# Patient Record
Sex: FEMALE | Race: WHITE | NOT HISPANIC OR LATINO | Employment: FULL TIME | URBAN - NONMETROPOLITAN AREA
[De-identification: names, ages, dates, MRNs, and addresses within clinical notes are randomized per-mention and may not be internally consistent; named-entity substitution may affect disease eponyms.]

---

## 2024-01-29 ENCOUNTER — OFFICE VISIT (OUTPATIENT)
Dept: OBSTETRICS AND GYNECOLOGY | Age: 24
End: 2024-01-29
Payer: COMMERCIAL

## 2024-01-29 VITALS
DIASTOLIC BLOOD PRESSURE: 70 MMHG | WEIGHT: 204 LBS | BODY MASS INDEX: 37.54 KG/M2 | HEIGHT: 62 IN | SYSTOLIC BLOOD PRESSURE: 128 MMHG

## 2024-01-29 DIAGNOSIS — Z30.011 ENCOUNTER FOR PRESCRIPTION OF ORAL CONTRACEPTIVES: ICD-10-CM

## 2024-01-29 DIAGNOSIS — N92.0 MENORRHAGIA WITH REGULAR CYCLE: Primary | ICD-10-CM

## 2024-01-29 LAB
B-HCG UR QL: NEGATIVE
EXPIRATION DATE: NORMAL
INTERNAL NEGATIVE CONTROL: NEGATIVE
INTERNAL POSITIVE CONTROL: POSITIVE
Lab: NORMAL

## 2024-01-29 PROCEDURE — 81025 URINE PREGNANCY TEST: CPT

## 2024-01-29 PROCEDURE — 99213 OFFICE O/P EST LOW 20 MIN: CPT

## 2024-01-29 RX ORDER — DROSPIRENONE AND ETHINYL ESTRADIOL 0.02-3(28)
1 KIT ORAL DAILY
Qty: 84 TABLET | Refills: 0 | Status: SHIPPED | OUTPATIENT
Start: 2024-01-29 | End: 2024-04-28

## 2024-01-29 NOTE — PROGRESS NOTES
Subjective   Ana Maria RODRIGUEZ is a 23 y.o. female.     History of Present Illness  The patient is new to [facility] and here today to establish care. She reports her last pap smear was performed last year with her PCP and was normal. She would like to establish care here and discuss contraceptive options, specifically starting an oral form. She reports her cycles are regular, lasting 5-6 days, with heavy flow for the first two days. She does pass clots with her cycles.   Contraception  This is a new problem. The current episode started more than 1 year ago. The problem occurs every several days. The problem has been waxing and waning. Associated symptoms include abdominal pain and fatigue. Pertinent negatives include no anorexia, arthralgias, change in bowel habit, chest pain, chills, congestion, coughing, diaphoresis, fever, headaches, joint swelling, myalgias, nausea, neck pain, numbness, rash, sore throat, swollen glands, urinary symptoms, vertigo, visual change, vomiting or weakness. Nothing aggravates the symptoms. She has tried nothing for the symptoms. The treatment provided no relief.        Review of Systems   Constitutional:  Positive for fatigue. Negative for chills, diaphoresis and fever.   HENT: Negative.  Negative for congestion, sore throat and swollen glands.    Eyes: Negative.    Respiratory: Negative.  Negative for cough.    Cardiovascular: Negative.  Negative for chest pain.   Gastrointestinal:  Positive for abdominal pain. Negative for anorexia, change in bowel habit, nausea and vomiting.   Endocrine: Negative.    Genitourinary:  Positive for menstrual problem.   Musculoskeletal: Negative.  Negative for arthralgias, joint swelling, myalgias and neck pain.   Skin: Negative.  Negative for rash.   Allergic/Immunologic: Negative.    Neurological: Negative.  Negative for vertigo, weakness and numbness.   Hematological: Negative.    Psychiatric/Behavioral: Negative.         Objective   Physical  Exam  Vitals and nursing note reviewed. Exam conducted with a chaperone present.   Constitutional:       General: She is not in acute distress.     Appearance: She is well-developed. She is obese. She is not diaphoretic.   HENT:      Head: Normocephalic.      Right Ear: External ear normal.      Left Ear: External ear normal.      Nose: Nose normal.   Eyes:      General: No scleral icterus.        Right eye: No discharge.         Left eye: No discharge.      Conjunctiva/sclera: Conjunctivae normal.      Pupils: Pupils are equal, round, and reactive to light.   Neck:      Thyroid: No thyromegaly.      Vascular: No carotid bruit.      Trachea: No tracheal deviation.   Cardiovascular:      Rate and Rhythm: Normal rate and regular rhythm.      Pulses: Normal pulses.      Heart sounds: Normal heart sounds. No murmur heard.  Pulmonary:      Effort: Pulmonary effort is normal. No respiratory distress.      Breath sounds: Normal breath sounds. No wheezing.   Chest:   Breasts:     Breasts are symmetrical.      Right: Normal. No swelling, bleeding, inverted nipple, mass, nipple discharge, skin change or tenderness.      Left: Normal. No swelling, bleeding, inverted nipple, mass, nipple discharge, skin change or tenderness.   Abdominal:      General: Abdomen is flat. Bowel sounds are normal. There is no distension.      Palpations: Abdomen is soft. There is no mass.      Tenderness: There is no abdominal tenderness. There is no right CVA tenderness, left CVA tenderness or guarding.      Hernia: No hernia is present.   Genitourinary:     Comments: BSU normal  Urethral meatus  Normal  Perineum  Normal  Musculoskeletal:         General: No tenderness. Normal range of motion.      Cervical back: Normal range of motion and neck supple.   Lymphadenopathy:      Head:      Right side of head: No submental, submandibular, tonsillar, preauricular, posterior auricular or occipital adenopathy.      Left side of head: No submental,  submandibular, tonsillar, preauricular, posterior auricular or occipital adenopathy.      Cervical: No cervical adenopathy.      Right cervical: No superficial, deep or posterior cervical adenopathy.     Left cervical: No superficial, deep or posterior cervical adenopathy.      Upper Body:      Right upper body: No supraclavicular, axillary or pectoral adenopathy.      Left upper body: No supraclavicular, axillary or pectoral adenopathy.   Skin:     General: Skin is warm and dry.      Findings: No bruising, erythema or rash.   Neurological:      Mental Status: She is alert and oriented to person, place, and time.      Coordination: Coordination normal.   Psychiatric:         Mood and Affect: Mood normal.         Behavior: Behavior normal.         Thought Content: Thought content normal.         Judgment: Judgment normal.       Assessment & Plan   Diagnoses and all orders for this visit:    1. Menorrhagia with regular cycle (Primary)  Comments:  The patient reports cycles are normal now lasting 5-6 days, with heavy bleeding, and passing of large clots. She has to wears double protection (pad and tampon) and changes this every 2 hours. She admits to abdominal cramping and fatigue during her cycle. She has previously tried the Mirena IUD for this and reports her bleeding and cramping were worse with the device. She reports a history of hemorrhagic ovarian cysts but denies pelvic pain today.     2. Encounter for prescription of oral contraceptives  Comments:  The patient wishes to discuss ocp options. Verbal and visual education were given regarding options for contraception, including injectable contraception, IUD placement, oral contraceptives, Nexplanon, patch, NuvaRing, and barrier methods. At this time, she wishes to start oral contraception. She was advised that oral contraception does not prevent STDs and she would need to use a backup form of contraception such as a barrier or condom for pregnancy prevention.  She v/u of this information. She will return to the office in 3 months to gauge how this ocp is working for her. She acknowledges that adjustments to oral medications may be needed to achieve her desired effects of period control.   Orders:  -     drospirenone-ethinyl estradiol (FELTON) 3-0.02 MG per tablet; Take 1 tablet by mouth Daily  Dispense: 84 tablet; Refill: 0        LEANNE Baker

## 2024-06-11 DIAGNOSIS — F41.1 GAD (GENERALIZED ANXIETY DISORDER): ICD-10-CM

## 2024-06-11 RX ORDER — FLUOXETINE HYDROCHLORIDE 40 MG/1
40 CAPSULE ORAL DAILY
Qty: 90 CAPSULE | Refills: 1 | Status: SHIPPED | OUTPATIENT
Start: 2024-06-11

## 2024-07-24 ENCOUNTER — LAB (OUTPATIENT)
Dept: LAB | Facility: HOSPITAL | Age: 24
End: 2024-07-24
Payer: COMMERCIAL

## 2024-07-24 ENCOUNTER — OFFICE VISIT (OUTPATIENT)
Dept: FAMILY MEDICINE CLINIC | Facility: CLINIC | Age: 24
End: 2024-07-24
Payer: COMMERCIAL

## 2024-07-24 VITALS
SYSTOLIC BLOOD PRESSURE: 114 MMHG | HEART RATE: 67 BPM | HEIGHT: 62 IN | BODY MASS INDEX: 38.05 KG/M2 | RESPIRATION RATE: 18 BRPM | DIASTOLIC BLOOD PRESSURE: 78 MMHG | TEMPERATURE: 97.4 F | OXYGEN SATURATION: 97 % | WEIGHT: 206.8 LBS

## 2024-07-24 DIAGNOSIS — F41.1 GAD (GENERALIZED ANXIETY DISORDER): ICD-10-CM

## 2024-07-24 DIAGNOSIS — E66.9 OBESITY (BMI 30-39.9): ICD-10-CM

## 2024-07-24 DIAGNOSIS — Z00.00 ANNUAL PHYSICAL EXAM: Primary | ICD-10-CM

## 2024-07-24 LAB
ALBUMIN SERPL-MCNC: 4.1 G/DL (ref 3.5–5.2)
ALBUMIN/GLOB SERPL: 1.2 G/DL
ALP SERPL-CCNC: 67 U/L (ref 39–117)
ALT SERPL W P-5'-P-CCNC: 24 U/L (ref 1–33)
ANION GAP SERPL CALCULATED.3IONS-SCNC: 9 MMOL/L (ref 5–15)
AST SERPL-CCNC: 22 U/L (ref 1–32)
BILIRUB SERPL-MCNC: 0.2 MG/DL (ref 0–1.2)
BUN SERPL-MCNC: 11 MG/DL (ref 6–20)
BUN/CREAT SERPL: 17.5 (ref 7–25)
CALCIUM SPEC-SCNC: 8.8 MG/DL (ref 8.6–10.5)
CHLORIDE SERPL-SCNC: 104 MMOL/L (ref 98–107)
CHOLEST SERPL-MCNC: 171 MG/DL (ref 0–200)
CO2 SERPL-SCNC: 26 MMOL/L (ref 22–29)
CREAT SERPL-MCNC: 0.63 MG/DL (ref 0.57–1)
EGFRCR SERPLBLD CKD-EPI 2021: 127.2 ML/MIN/1.73
GLOBULIN UR ELPH-MCNC: 3.3 GM/DL
GLUCOSE SERPL-MCNC: 93 MG/DL (ref 65–99)
HBA1C MFR BLD: 5.1 % (ref 4.8–5.6)
HDLC SERPL-MCNC: 42 MG/DL (ref 40–60)
LDLC SERPL CALC-MCNC: 114 MG/DL (ref 0–100)
LDLC/HDLC SERPL: 2.68 {RATIO}
POTASSIUM SERPL-SCNC: 4.2 MMOL/L (ref 3.5–5.2)
PROT SERPL-MCNC: 7.4 G/DL (ref 6–8.5)
SODIUM SERPL-SCNC: 139 MMOL/L (ref 136–145)
TRIGL SERPL-MCNC: 82 MG/DL (ref 0–150)
TSH SERPL DL<=0.05 MIU/L-ACNC: 0.98 UIU/ML (ref 0.27–4.2)
VLDLC SERPL-MCNC: 15 MG/DL (ref 5–40)

## 2024-07-24 PROCEDURE — 80061 LIPID PANEL: CPT

## 2024-07-24 PROCEDURE — 99395 PREV VISIT EST AGE 18-39: CPT | Performed by: FAMILY MEDICINE

## 2024-07-24 PROCEDURE — 83036 HEMOGLOBIN GLYCOSYLATED A1C: CPT

## 2024-07-24 PROCEDURE — 84443 ASSAY THYROID STIM HORMONE: CPT

## 2024-07-24 PROCEDURE — 80053 COMPREHEN METABOLIC PANEL: CPT

## 2024-07-24 PROCEDURE — 36415 COLL VENOUS BLD VENIPUNCTURE: CPT

## 2024-07-24 NOTE — PROGRESS NOTES
"Chief Complaint  Annual Exam    Subjective        Ana Maria RODRIGUEZ presents to [facility]  History of Present Illness  Here for annual exam  Anxiety stable on prozac  ROS neg    Past Medical History:   Diagnosis Date    Scoliosis      Past Surgical History:   Procedure Laterality Date    COLONOSCOPY      WISDOM TOOTH EXTRACTION       Current Outpatient Medications   Medication Instructions    drospirenone-ethinyl estradiol (FELTON) 3-0.02 MG per tablet Take 1 tablet by mouth Daily    FLUoxetine (PROZAC) 40 mg, Oral, Daily    ondansetron ODT (ZOFRAN-ODT) 8 mg, Translingual, Every 8 Hours PRN     Allergies   Allergen Reactions    Wasp Venom Protein Swelling     Social History     Tobacco Use    Smoking status: Never     Passive exposure: Never    Smokeless tobacco: Never   Vaping Use    Vaping status: Former    Quit date: 1/1/2021    Substances: Nicotine    Devices: Disposable   Substance Use Topics    Alcohol use: Yes     Alcohol/week: 1.0 standard drink of alcohol     Types: 1 Drinks containing 0.5 oz of alcohol per week     Comment: 1 drink per week    Drug use: No     Family History   Problem Relation Age of Onset    No Known Problems Father     No Known Problems Mother     No Known Problems Brother     No Known Problems Sister     Breast cancer Neg Hx     Ovarian cancer Neg Hx     Uterine cancer Neg Hx     Colon cancer Neg Hx     Melanoma Neg Hx        Objective   Vital Signs:  /78   Pulse 67   Temp 97.4 °F (36.3 °C)   Resp 18   Ht 157.5 cm (62\")   Wt 93.8 kg (206 lb 12.8 oz)   SpO2 97%   BMI 37.82 kg/m²   Estimated body mass index is 37.82 kg/m² as calculated from the following:    Height as of this encounter: 157.5 cm (62\").    Weight as of this encounter: 93.8 kg (206 lb 12.8 oz).               Physical Exam  Constitutional:       General: She is not in acute distress.     Appearance: Normal appearance. She is obese. She is not ill-appearing or diaphoretic. "   HENT:      Head: Normocephalic and atraumatic.      Right Ear: Tympanic membrane and external ear normal.      Left Ear: Tympanic membrane and external ear normal.      Nose: Nose normal. No congestion or rhinorrhea.      Mouth/Throat:      Mouth: Mucous membranes are moist.      Pharynx: Oropharynx is clear. No oropharyngeal exudate or posterior oropharyngeal erythema.   Eyes:      General: No scleral icterus.        Right eye: No discharge.         Left eye: No discharge.      Extraocular Movements: Extraocular movements intact.      Conjunctiva/sclera: Conjunctivae normal.      Pupils: Pupils are equal, round, and reactive to light.   Neck:      Thyroid: No thyromegaly.      Vascular: No JVD.   Cardiovascular:      Rate and Rhythm: Normal rate and regular rhythm.      Pulses: Normal pulses.      Heart sounds: Normal heart sounds. No murmur heard.     No friction rub. No gallop.   Pulmonary:      Effort: Pulmonary effort is normal.      Breath sounds: Normal breath sounds.   Abdominal:      General: Bowel sounds are normal. There is no distension.      Palpations: Abdomen is soft. There is no mass.      Tenderness: There is no abdominal tenderness. There is no guarding or rebound.   Musculoskeletal:         General: No deformity or signs of injury.      Cervical back: Neck supple.      Right lower leg: No edema.      Left lower leg: No edema.   Lymphadenopathy:      Cervical: No cervical adenopathy.   Skin:     General: Skin is warm and dry.      Capillary Refill: Capillary refill takes less than 2 seconds.      Coloration: Skin is not jaundiced or pale.   Neurological:      Mental Status: She is alert and oriented to person, place, and time. Mental status is at baseline.   Psychiatric:         Mood and Affect: Mood normal.         Behavior: Behavior normal.         Thought Content: Thought content normal.         Judgment: Judgment normal.        Result Review :                     Assessment and Plan      Diagnoses and all orders for this visit:    1. Annual physical exam (Primary)    2. CELESTINO (generalized anxiety disorder)    3. Obesity (BMI 30-39.9)  -     Lipid panel; Future  -     TSH; Future  -     Hemoglobin A1c; Future  -     Comprehensive Metabolic Panel; Future    Preventative: encourage diet, exercise for weight loss  Continue prozac  F/u yearly

## 2024-09-18 ENCOUNTER — TELEMEDICINE (OUTPATIENT)
Dept: FAMILY MEDICINE CLINIC | Facility: TELEHEALTH | Age: 24
End: 2024-09-18
Payer: COMMERCIAL

## 2024-09-18 DIAGNOSIS — J06.9 UPPER RESPIRATORY TRACT INFECTION, UNSPECIFIED TYPE: Primary | ICD-10-CM

## 2024-09-18 RX ORDER — BROMPHENIRAMINE MALEATE, PSEUDOEPHEDRINE HYDROCHLORIDE, AND DEXTROMETHORPHAN HYDROBROMIDE 2; 30; 10 MG/5ML; MG/5ML; MG/5ML
5 SYRUP ORAL 3 TIMES DAILY PRN
Qty: 120 ML | Refills: 0 | Status: SHIPPED | OUTPATIENT
Start: 2024-09-18

## 2024-11-26 ENCOUNTER — TELEPHONE (OUTPATIENT)
Dept: FAMILY MEDICINE CLINIC | Facility: CLINIC | Age: 24
End: 2024-11-26
Payer: COMMERCIAL

## 2024-11-26 DIAGNOSIS — F41.1 GAD (GENERALIZED ANXIETY DISORDER): ICD-10-CM

## 2024-11-26 RX ORDER — FLUOXETINE 40 MG/1
40 CAPSULE ORAL DAILY
Qty: 90 CAPSULE | Refills: 1 | Status: SHIPPED | OUTPATIENT
Start: 2024-11-26

## 2024-11-26 NOTE — TELEPHONE ENCOUNTER
Caller: Ana Maria RODRIGUEZ    Relationship: Self    Best call back number: [phone]     What is the best time to reach you: ANYTIME    Who are you requesting to speak with (clinical staff, provider,  specific staff member): CLINICAL    What was the call regarding: FLUoxetine (PROzac) 40 MG capsule PLEASE SWITCH OVER TO     [facility] #[id number] - [facility] - [address] AT [facility] [address] - [phone]  - [phone] FX     Is it okay if the provider responds through MyChart: NO

## 2025-02-14 ENCOUNTER — OFFICE VISIT (OUTPATIENT)
Dept: INTERNAL MEDICINE | Facility: CLINIC | Age: 25
End: 2025-02-14

## 2025-02-14 VITALS
BODY MASS INDEX: 39.75 KG/M2 | SYSTOLIC BLOOD PRESSURE: 110 MMHG | HEART RATE: 80 BPM | TEMPERATURE: 98.6 F | OXYGEN SATURATION: 99 % | RESPIRATION RATE: 16 BRPM | HEIGHT: 62 IN | DIASTOLIC BLOOD PRESSURE: 82 MMHG | WEIGHT: 216 LBS

## 2025-02-14 DIAGNOSIS — E78.00 ELEVATED LDL CHOLESTEROL LEVEL: ICD-10-CM

## 2025-02-14 DIAGNOSIS — F41.1 GAD (GENERALIZED ANXIETY DISORDER): ICD-10-CM

## 2025-02-14 DIAGNOSIS — Z76.89 ENCOUNTER TO ESTABLISH CARE: Primary | ICD-10-CM

## 2025-02-14 DIAGNOSIS — E66.812 CLASS 2 SEVERE OBESITY DUE TO EXCESS CALORIES WITH SERIOUS COMORBIDITY AND BODY MASS INDEX (BMI) OF 39.0 TO 39.9 IN ADULT: ICD-10-CM

## 2025-02-14 DIAGNOSIS — E66.01 CLASS 2 SEVERE OBESITY DUE TO EXCESS CALORIES WITH SERIOUS COMORBIDITY AND BODY MASS INDEX (BMI) OF 39.0 TO 39.9 IN ADULT: ICD-10-CM

## 2025-02-14 RX ORDER — FLUOXETINE 40 MG/1
40 CAPSULE ORAL DAILY
Qty: 90 CAPSULE | Refills: 1 | Status: SHIPPED | OUTPATIENT
Start: 2025-02-14

## 2025-02-14 NOTE — PROGRESS NOTES
Subjective   Ana Maria RODRIGUEZ is a 24 y.o. female.   Chief Complaint   Patient presents with    Establish Care     Patient states she has a spot on her right upper back.       History of Present Illness   History of Present Illness  The patient is a 24-year-old female who presents to the office today to establish care.    She reports a scar on her back, initially identified as a freckle, which has been progressively enlarging over the past year. The change in size was first noticed by her sister. She has no personal history of skin cancer but acknowledges a family predisposition to various cancers. She also reports the presence of other spots that have increased in size and elevation, which she attributes to her previous tanning habits. However, she has not observed any changes in these spots over the past 3 months. She has a history of sensitive skin, prone to severe sunburns, including an incident resulting in a second-degree burn. Despite a past history of tanning bed use, she has abstained from this practice since the age of 19 due to the enlargement of freckles on her arms.    Her last physical examination was conducted in 07/2024, during which no laboratory tests were performed. She has a history of colonoscopy due to digestive issues and hemorrhoids, which were associated with significant bleeding. She also reports a history of constipation, characterized by slow, hard bowel movements, which has since resolved following dietary modifications. She now maintains regular bowel movements. She has been adhering to a high-fiber diet for several years and has recently initiated a calorie-deficit diet to facilitate weight loss. She initially reduced her caloric intake to 1600 calories, but due to lack of efficacy, she further reduced it to 1300 calories. She reports one episode of hypoglycemia during work, necessitating immediate food intake. She has since adjusted her meal schedule to include a snack between  breakfast and lunch, which appears to be effective. She is currently not using insurance due to some issues and is relying on her health savings account.    She is currently on fluoxetine 40 mg daily for anxiety management, which she reports as being highly effective. She is seeking a refill of this medication.    She has a known allergy to WASP venom, with a previous sting resulting in significant swelling. She does not currently possess an EpiPen due to expiration and lack of insurance coverage.    She reports no chest pain or shortness of breath. She has a history of asthma in childhood, but has not experienced any symptoms since the age of 6. She does not smoke or vape, although she admits to brief experimentation at the age of 17, which she found unpleasant.    Supplemental Information  She had her wisdom teeth removed.    SOCIAL HISTORY  She does not smoke or vape. She admits to using a tanning bed in the past but stopped at age 19.    FAMILY HISTORY  She reports a strong family history of cancer, including her maternal grandmother, paternal grandfather, maternal grandfather, and paternal grandmother. Her father has heart problems, but she is unsure of the specifics.    ALLERGIES  She is allergic to WASP STINGS.    MEDICATIONS  fluoxetine       The following portions of the patient's history were reviewed and updated as appropriate: allergies, current medications, past family history, past medical history, past social history, past surgical history and problem list.    Review of Systems    Objective   Past Medical History:   Diagnosis Date    Scoliosis       Past Surgical History:   Procedure Laterality Date    COLONOSCOPY      WISDOM TOOTH EXTRACTION          Current Outpatient Medications:     FLUoxetine (PROzac) 40 MG capsule, Take 1 capsule by mouth Daily., Disp: 90 capsule, Rfl: 1      /82 (BP Location: Left arm, Patient Position: Sitting, Cuff Size: Adult)   Pulse 80   Temp 98.6 °F (37 °C)  "(Infrared)   Resp 16   Ht 157.5 cm (62\")   Wt 98 kg (216 lb)   SpO2 99%   BMI 39.51 kg/m²      Body mass index is 39.51 kg/m².          Physical Exam  Vitals and nursing note reviewed.   Constitutional:       General: She is not in acute distress.     Appearance: Normal appearance. She is obese. She is not ill-appearing, toxic-appearing or diaphoretic.   HENT:      Head: Normocephalic and atraumatic.   Eyes:      Extraocular Movements: Extraocular movements intact.      Conjunctiva/sclera: Conjunctivae normal.      Pupils: Pupils are equal, round, and reactive to light.   Cardiovascular:      Rate and Rhythm: Normal rate and regular rhythm.      Pulses: Normal pulses.      Heart sounds: Normal heart sounds.   Pulmonary:      Effort: Pulmonary effort is normal.      Breath sounds: Normal breath sounds.   Abdominal:      General: Bowel sounds are normal.      Palpations: Abdomen is soft.   Musculoskeletal:         General: Normal range of motion.      Cervical back: Normal range of motion and neck supple.   Skin:     General: Skin is warm.      Findings: Lesion present.             Comments: Roughly 10 mm sized raised lesion with epidermal and subdermal presents based on palpation, slight discoloration in central region, more tanned versus the rest is more natural skin tone.  Nonpainful, no surrounding erythema, no evidence of drainage.   Neurological:      General: No focal deficit present.      Mental Status: She is alert and oriented to person, place, and time. Mental status is at baseline.   Psychiatric:         Mood and Affect: Mood normal.         Behavior: Behavior normal.         Thought Content: Thought content normal.         Judgment: Judgment normal.               Assessment & Plan   Diagnoses and all orders for this visit:    1. Encounter to establish care (Primary)  -     CBC & Differential; Future  -     Comprehensive Metabolic Panel; Future  -     Urinalysis With Microscopic - Urine, Clean Catch; " Future  -     Lipid Panel; Future  -     TSH Rfx On Abnormal To Free T4; Future    2. CELESTINO (generalized anxiety disorder)  -     FLUoxetine (PROzac) 40 MG capsule; Take 1 capsule by mouth Daily.  Dispense: 90 capsule; Refill: 1    3. Class 2 severe obesity due to excess calories with serious comorbidity and body mass index (BMI) of 39.0 to 39.9 in adult  -     Comprehensive Metabolic Panel; Future  -     Lipid Panel; Future  -     TSH Rfx On Abnormal To Free T4; Future    4. Elevated LDL cholesterol level  -     Lipid Panel; Future                 Assessment & Plan  1. Establishment of care.  Her blood pressure readings are within the normal range at 110/82. She has expressed interest in undergoing comprehensive laboratory tests. She has been advised to continue her current dietary regimen and to monitor her caloric intake to prevent hypoglycemia. A comprehensive set of laboratory tests will be ordered for her next visit.    2. Anxiety.  A prescription for fluoxetine 40 mg daily will be sent to her [facility] pharmacy. She reports that the medication works well for her anxiety.    3. Skin lesion.  The lesion on her back has been progressively enlarging over the past year. It measures approximately 10 mm and has a tan discoloration in the center. She has been advised to continue using sunscreen and to monitor her skin for any changes. A punch biopsy of the lesion will be performed next Friday.  If the results indicate malignancy, a referral to a dermatologist for a comprehensive skin assessment and potential additional biopsies will be recommended.    4. Allergy to WASP stings.  She does not currently have EpiPen, no insurance coverage currently cannot afford EpiPen, advised to let me know when she has insurance coverage so we can send this to the pharmacy.    5. History of childhood asthma.  She has been advised to inform us if she experiences chest congestion, coughing, or difficulty breathing during illness, at  which point an albuterol inhaler will be prescribed.    PROCEDURE  Colonoscopy was performed in the past due to digestive issues and hemorrhoids.    Patient or patient representative verbalized consent for the use of Ambient Listening during the visit with  LEANNE Ortega for chart documentation. 2/14/2025  17:54 CST    Please note that portions of this note were completed with a voice recognition program.     Electronically signed by LEANNE Ortega, 02/14/25, 17:56 CST.

## 2025-02-17 ENCOUNTER — PATIENT ROUNDING (BHMG ONLY) (OUTPATIENT)
Dept: INTERNAL MEDICINE | Facility: CLINIC | Age: 25
End: 2025-02-17
Payer: COMMERCIAL

## 2025-02-21 ENCOUNTER — OFFICE VISIT (OUTPATIENT)
Dept: INTERNAL MEDICINE | Facility: CLINIC | Age: 25
End: 2025-02-21

## 2025-02-21 VITALS
BODY MASS INDEX: 39.75 KG/M2 | HEART RATE: 77 BPM | HEIGHT: 62 IN | TEMPERATURE: 97.6 F | OXYGEN SATURATION: 98 % | SYSTOLIC BLOOD PRESSURE: 108 MMHG | WEIGHT: 216 LBS | DIASTOLIC BLOOD PRESSURE: 76 MMHG

## 2025-02-21 DIAGNOSIS — E66.812 CLASS 2 SEVERE OBESITY DUE TO EXCESS CALORIES WITH SERIOUS COMORBIDITY AND BODY MASS INDEX (BMI) OF 39.0 TO 39.9 IN ADULT: ICD-10-CM

## 2025-02-21 DIAGNOSIS — E78.00 ELEVATED LDL CHOLESTEROL LEVEL: ICD-10-CM

## 2025-02-21 DIAGNOSIS — L98.9 SKIN LESION OF BACK: Primary | ICD-10-CM

## 2025-02-21 DIAGNOSIS — E66.01 CLASS 2 SEVERE OBESITY DUE TO EXCESS CALORIES WITH SERIOUS COMORBIDITY AND BODY MASS INDEX (BMI) OF 39.0 TO 39.9 IN ADULT: ICD-10-CM

## 2025-02-21 DIAGNOSIS — Z91.89 HISTORY OF SUN EXPOSURE, SEVERE: ICD-10-CM

## 2025-02-21 RX ORDER — LIDOCAINE HYDROCHLORIDE 10 MG/ML
4 INJECTION, SOLUTION INFILTRATION; PERINEURAL ONCE
Status: COMPLETED | OUTPATIENT
Start: 2025-02-21 | End: 2025-02-21

## 2025-02-21 RX ADMIN — LIDOCAINE HYDROCHLORIDE 4 ML: 10 INJECTION, SOLUTION INFILTRATION; PERINEURAL at 17:32

## 2025-02-21 NOTE — PROGRESS NOTES
"Subjective   Ana Maria TAVAREZ is a 24 y.o. female.   Chief Complaint   Patient presents with    Biopsy     Lesion located of the R shoulder -   Roughly 10 mm sized raised lesion with epidermal and subdermal presents based on palpation, slight discoloration in central region, more tanned versus the rest is more natural skin tone.  Nonpainful, no surrounding erythema, no evidence of drainage.        History of Present Illness   History of Present Illness    Ana Maria Tavarez presents to the office today for skin lesion removal and to discuss recent lab results.  At her office visit 1 week ago she expressed concerns of a skin lesion located on her right upper back/right posterior shoulder region that had been notably present for quite some time however in the recent year it has been increasing in size which has raised concern for possible skin cancer..  She reports no history of skin cancer but reports history of tanning bed exposure and numerous severe sunburns.  She does report at this time she is more conscious of avoiding excessive sun exposure, wears sunscreen and protective clothing/gear when exposed to prolonged sunlight.    The following portions of the patient's history were reviewed and updated as appropriate: allergies, current medications, past family history, past medical history, past social history, past surgical history and problem list.    Review of Systems    Objective   Past Medical History:   Diagnosis Date    Scoliosis       Past Surgical History:   Procedure Laterality Date    COLONOSCOPY      WISDOM TOOTH EXTRACTION          Current Outpatient Medications:     FLUoxetine (PROzac) 40 MG capsule, Take 1 capsule by mouth Daily., Disp: 90 capsule, Rfl: 1  No current facility-administered medications for this visit.      /76 (BP Location: Left arm, Patient Position: Sitting, Cuff Size: Adult)   Pulse 77   Temp 97.6 °F (36.4 °C) (Infrared)   Ht 157.5 cm (62\")   Wt 98 kg (216 lb)   LMP " 01/27/2025   SpO2 98%   BMI 39.51 kg/m²      Body mass index is 39.51 kg/m².          Physical Exam  Vitals and nursing note reviewed.   Constitutional:       Appearance: Normal appearance. She is obese.   HENT:      Head: Normocephalic and atraumatic.   Eyes:      Extraocular Movements: Extraocular movements intact.      Conjunctiva/sclera: Conjunctivae normal.      Pupils: Pupils are equal, round, and reactive to light.   Pulmonary:      Effort: Pulmonary effort is normal.   Musculoskeletal:      Cervical back: Normal range of motion and neck supple.   Skin:     Findings: Lesion (5 mm raised lesion with dermal involvement to posterior aspect of right shoulder/upper right side back) present.   Neurological:      General: No focal deficit present.      Mental Status: She is alert and oriented to person, place, and time. Mental status is at baseline.   Psychiatric:         Mood and Affect: Mood normal.         Behavior: Behavior normal.         Thought Content: Thought content normal.         Judgment: Judgment normal.               Assessment & Plan   Diagnoses and all orders for this visit:    1. Skin lesion of back (Primary)  -     Reference Histopathology  -     lidocaine (XYLOCAINE) 1 % injection 4 mL    2. History of sun exposure, severe    3. Elevated LDL cholesterol level    4. Class 2 severe obesity due to excess calories with serious comorbidity and body mass index (BMI) of 39.0 to 39.9 in adult    Other orders  -     Biopsy               Biopsy    Date/Time: 2/21/2025 5:32 PM    Performed by: Maddie Cabral APRN  Authorized by: Maddie Cabral APRN    Procedure Details - Skin Biopsy:     Body area: trunk    Trunk location: back    Initial size (mm): 6    Final defect size (mm): 3    Malignancy: malignancy unknown      Destruction method comment: removed with scalpel, scissors and forceps    Comments:   Verbal consent obtained prior to procedure, risks including scarring, infection, bleeding  explained.  Anesthetized with 4 mL total after completion with 1% lidocaine.  Hemostasis obtained with pressure application after biopsy completion.  Unlike expected, no cyst like contents, will proceed with sending for histopathology.  Given size of excision area, sutures x 4 placed with 4-0 Ethilon black monofilament FS-2.  Surrounding area was cleaned, barrier of antibiotic ointment applied, Steri-Strips x 3 to help with reinforcement given region of incision to deter risk of skin integrity impairment.  Instructed to leave Steri-Strips in place for at least 3 days, may take off gauze and Band-Aid dressing on for 24 hours.  Keep clean with warm water and antibacterial soap twice daily.  Monitor for signs of bleeding, infection, report if this occurs.  Instructed to have site reevaluated in 7 days by provider here determine readiness for suture removal.     Assessment & Plan    Recent labs reviewed, overall unremarkable, did discuss possibility of being slightly iron deficient, she does report heavy menstrual bleeding during her periods, encouraged to consider taking a prenatal with iron in it during weeks of menstrual bleeding.  Discussed LDL of 128, recommended proceeding forward with planned lifestyle modifications which includes practicing portion control, adhering to an overall healthy nutrient dense diet and participating in regular routine exercise for management.  We will schedule her for a follow-up visit at some point this year when she has updated/better healthcare coverage.   Will relay results of biopsy once available.      Please note that portions of this note were completed with a voice recognition program.     Electronically signed by LEANNE Ortega, 02/21/25, 17:42 CST.

## 2025-02-26 LAB
DX ICD CODE: NORMAL
DX ICD CODE: NORMAL
PATH REPORT.FINAL DX SPEC: NORMAL
PATH REPORT.GROSS SPEC: NORMAL
PATH REPORT.RELEVANT HX SPEC: NORMAL
PATH REPORT.SITE OF ORIGIN SPEC: NORMAL
PATHOLOGIST NAME: NORMAL
PAYMENT PROCEDURE: NORMAL

## 2025-02-28 ENCOUNTER — CLINICAL SUPPORT (OUTPATIENT)
Dept: INTERNAL MEDICINE | Facility: CLINIC | Age: 25
End: 2025-02-28

## 2025-02-28 DIAGNOSIS — Z48.02 ENCOUNTER FOR REMOVAL OF SUTURES: Primary | ICD-10-CM

## 2025-02-28 NOTE — PROGRESS NOTES
4 sutures removed from patients right shoulder today 7 days after biopsy- removed with out incident

## 2025-03-14 ENCOUNTER — TELEPHONE (OUTPATIENT)
Dept: INTERNAL MEDICINE | Facility: CLINIC | Age: 25
End: 2025-03-14
Payer: MEDICAID

## 2025-04-01 ENCOUNTER — OFFICE VISIT (OUTPATIENT)
Dept: INTERNAL MEDICINE | Facility: CLINIC | Age: 25
End: 2025-04-01

## 2025-04-01 VITALS
HEART RATE: 65 BPM | TEMPERATURE: 97.6 F | DIASTOLIC BLOOD PRESSURE: 84 MMHG | OXYGEN SATURATION: 98 % | BODY MASS INDEX: 40.56 KG/M2 | WEIGHT: 220.4 LBS | HEIGHT: 62 IN | SYSTOLIC BLOOD PRESSURE: 112 MMHG

## 2025-04-01 DIAGNOSIS — J45.990 EXERCISE-INDUCED ASTHMA WITH ACUTE EXACERBATION: Primary | ICD-10-CM

## 2025-04-01 PROCEDURE — 99213 OFFICE O/P EST LOW 20 MIN: CPT

## 2025-04-01 RX ORDER — LEVALBUTEROL TARTRATE 45 UG/1
1-2 AEROSOL, METERED ORAL EVERY 6 HOURS PRN
Qty: 15 G | Refills: 1 | Status: SHIPPED | OUTPATIENT
Start: 2025-04-01

## 2025-04-01 RX ORDER — ALBUTEROL SULFATE 90 UG/1
2 INHALANT RESPIRATORY (INHALATION) EVERY 6 HOURS PRN
Qty: 6.7 G | Refills: 0 | Status: SHIPPED | OUTPATIENT
Start: 2025-04-01

## 2025-04-01 RX ORDER — PREDNISONE 20 MG/1
20 TABLET ORAL DAILY
Qty: 4 TABLET | Refills: 0 | Status: SHIPPED | OUTPATIENT
Start: 2025-04-01

## 2025-04-01 NOTE — PROGRESS NOTES
"Subjective   Ana Maria Toña RODRIGUEZ is a 24 y.o. female.   Chief Complaint   Patient presents with    Wheezing     Patient c/o wheezing x 6 months -   States within the last 3 weeks sx have worsened due to increase exertion - patient is working out 4 times a week.   Has tried an OTC Primatene mist Epinephrine inhaler and this did not improve sx.   States she is having trouble taking a deep breath and feels as if her airways are \"restricted\" and she will experience chest tightness.        Wheezing        History of Present Illness  The patient is a 24-year-old female who presents to the office today with complaints of experiencing shortness of air.    She has a history of childhood asthma, which she had not experienced significant symptoms since the age of 6. However, recently, she has been experiencing severe respiratory distress during physical activities such as gym workouts, characterized by a sensation of airway constriction, difficulty breathing, coughing, and wheezing. These symptoms are accompanied by congestion and mucus production, which only subside after expectoration. She reports that these symptoms began following an upper respiratory infection in 09/2024, for which she tested negative for all pathogens and was diagnosed with a viral illness. Since then, she has been experiencing episodes of coughing and shortness of breath, even during laughter. She describes a recent severe episode of respiratory distress following sexual intercourse, during which she felt as if she was breathing through a straw, with nasal congestion and chest tightness. She has been using an over-the-counter inhaler but finds it ineffective during workouts due to the presence of epinephrine, which elevates her heart rate. She does not have insurance coverage for medications currently.   She reports clear secretions and no fever or chills. Her cough is only present during these episodes. She has gained weight despite regular gym " "attendance and feels better overall but notes that her respiratory symptoms limit her physical activity. She has noticed that her symptoms worsen during cardio exercises. She has a history of vaping at the age of 17.    SOCIAL HISTORY  The patient had brief experimentation with vaping at the age of 17.       The following portions of the patient's history were reviewed and updated as appropriate: allergies, current medications, past family history, past medical history, past social history, past surgical history and problem list.    Review of Systems   Respiratory:  Positive for wheezing.        Objective   Past Medical History:   Diagnosis Date    Scoliosis       Past Surgical History:   Procedure Laterality Date    COLONOSCOPY      WISDOM TOOTH EXTRACTION          Current Outpatient Medications:     FLUoxetine (PROzac) 40 MG capsule, Take 1 capsule by mouth Daily., Disp: 90 capsule, Rfl: 1    albuterol sulfate  (90 Base) MCG/ACT inhaler, Inhale 2 puffs Every 6 (Six) Hours As Needed for Wheezing or Shortness of Air., Disp: 6.7 g, Rfl: 0    levalbuterol (Xopenex HFA) 45 MCG/ACT inhaler, Inhale 1-2 puffs Every 6 (Six) Hours As Needed for Wheezing or Shortness of Air., Disp: 15 g, Rfl: 1    predniSONE (DELTASONE) 20 MG tablet, Take 1 tablet by mouth Daily., Disp: 4 tablet, Rfl: 0      /84 (BP Location: Left arm, Patient Position: Sitting, Cuff Size: Adult)   Pulse 65   Temp 97.6 °F (36.4 °C) (Temporal)   Ht 157.5 cm (62\")   Wt 100 kg (220 lb 6.4 oz)   LMP 04/01/2025   SpO2 98%   BMI 40.31 kg/m²      Body mass index is 40.31 kg/m².          Physical Exam  Vitals and nursing note reviewed.   Constitutional:       General: She is not in acute distress.     Appearance: Normal appearance. She is obese. She is not ill-appearing, toxic-appearing or diaphoretic.   HENT:      Head: Normocephalic and atraumatic.   Eyes:      Extraocular Movements: Extraocular movements intact.      Conjunctiva/sclera: " Conjunctivae normal.      Pupils: Pupils are equal, round, and reactive to light.   Cardiovascular:      Rate and Rhythm: Normal rate and regular rhythm.      Pulses: Normal pulses.      Heart sounds: Normal heart sounds.   Pulmonary:      Effort: Pulmonary effort is normal.      Breath sounds: Normal breath sounds.   Musculoskeletal:         General: Normal range of motion.      Cervical back: Normal range of motion and neck supple.   Skin:     General: Skin is warm and dry.   Neurological:      General: No focal deficit present.      Mental Status: She is alert and oriented to person, place, and time. Mental status is at baseline.   Psychiatric:         Mood and Affect: Mood normal.         Behavior: Behavior normal.         Thought Content: Thought content normal.         Judgment: Judgment normal.               Assessment & Plan   Diagnoses and all orders for this visit:    1. Exercise-induced asthma with acute exacerbation (Primary)  -     levalbuterol (Xopenex HFA) 45 MCG/ACT inhaler; Inhale 1-2 puffs Every 6 (Six) Hours As Needed for Wheezing or Shortness of Air.  Dispense: 15 g; Refill: 1  -     albuterol sulfate  (90 Base) MCG/ACT inhaler; Inhale 2 puffs Every 6 (Six) Hours As Needed for Wheezing or Shortness of Air.  Dispense: 6.7 g; Refill: 0  -     predniSONE (DELTASONE) 20 MG tablet; Take 1 tablet by mouth Daily.  Dispense: 4 tablet; Refill: 0                 Assessment & Plan  1. Exercise-induced asthma.  Her symptoms, including shortness of breath, coughing, and wheezing during physical activity, are consistent with exercise-induced asthma. She has a history of asthma as a child, which may have been reactivated by a viral illness in September of last year. Her lung sounds are currently normal. A prescription for prednisone for 4 days has been issued to address potential residual inflammation. Additionally, prescriptions for Xopenex and albuterol have been provided (Xopenex preferred to decrease  risk of tachycardia but may be more expensive, albuterol as option in this case, encouraged to use good Rx or shop alternative pharmacy if necessary and to let me know), with instructions to use the inhaler 10 to 15 minutes prior to exercise and as needed thereafter. She has been advised to monitor her heart rate while on these medications. If there is no improvement in her condition, further diagnostic imaging of the lungs will be considered. If she requires daily use of albuterol for workouts, a maintenance inhaler may be introduced.  Encouraged to keep me up-to-date on status/efficacy and any continued issues.    Patient or patient representative verbalized consent for the use of Ambient Listening during the visit with  LEANNE Ortega for chart documentation. 4/1/2025  16:19 CDT    Please note that portions of this note were completed with a voice recognition program.     Electronically signed by LEANNE Ortega, 04/01/25, 16:19 CDT.

## 2025-05-19 DIAGNOSIS — F41.1 GAD (GENERALIZED ANXIETY DISORDER): ICD-10-CM

## 2025-05-19 RX ORDER — FLUOXETINE HYDROCHLORIDE 40 MG/1
40 CAPSULE ORAL DAILY
Qty: 90 CAPSULE | Refills: 1 | Status: SHIPPED | OUTPATIENT
Start: 2025-05-19

## 2025-07-31 ENCOUNTER — OFFICE VISIT (OUTPATIENT)
Dept: INTERNAL MEDICINE | Facility: CLINIC | Age: 25
End: 2025-07-31
Payer: MEDICAID

## 2025-07-31 VITALS
OXYGEN SATURATION: 98 % | DIASTOLIC BLOOD PRESSURE: 82 MMHG | HEART RATE: 71 BPM | SYSTOLIC BLOOD PRESSURE: 126 MMHG | WEIGHT: 215.6 LBS | HEIGHT: 62 IN | BODY MASS INDEX: 39.67 KG/M2 | TEMPERATURE: 98.1 F

## 2025-07-31 DIAGNOSIS — Z13.1 SCREENING FOR DIABETES MELLITUS: ICD-10-CM

## 2025-07-31 DIAGNOSIS — Z00.00 ANNUAL PHYSICAL EXAM: Primary | ICD-10-CM

## 2025-07-31 DIAGNOSIS — E66.812 CLASS 2 SEVERE OBESITY DUE TO EXCESS CALORIES WITH SERIOUS COMORBIDITY AND BODY MASS INDEX (BMI) OF 39.0 TO 39.9 IN ADULT: ICD-10-CM

## 2025-07-31 DIAGNOSIS — Z00.00 WELLNESS EXAMINATION: Primary | ICD-10-CM

## 2025-07-31 DIAGNOSIS — F41.1 GAD (GENERALIZED ANXIETY DISORDER): ICD-10-CM

## 2025-07-31 DIAGNOSIS — F43.21 GRIEVING: ICD-10-CM

## 2025-07-31 DIAGNOSIS — E66.01 CLASS 2 SEVERE OBESITY DUE TO EXCESS CALORIES WITH SERIOUS COMORBIDITY AND BODY MASS INDEX (BMI) OF 39.0 TO 39.9 IN ADULT: ICD-10-CM

## 2025-07-31 DIAGNOSIS — F51.01 PRIMARY INSOMNIA: ICD-10-CM

## 2025-07-31 DIAGNOSIS — Z79.899 ENCOUNTER FOR LONG-TERM (CURRENT) USE OF MEDICATIONS: ICD-10-CM

## 2025-07-31 DIAGNOSIS — E78.00 ELEVATED LDL CHOLESTEROL LEVEL: ICD-10-CM

## 2025-07-31 DIAGNOSIS — J45.990 EXERCISE-INDUCED ASTHMA: ICD-10-CM

## 2025-07-31 RX ORDER — HYDROXYZINE HYDROCHLORIDE 10 MG/1
TABLET, FILM COATED ORAL
Qty: 30 TABLET | Refills: 0 | Status: SHIPPED | OUTPATIENT
Start: 2025-07-31

## 2025-07-31 NOTE — PROGRESS NOTES
Subjective   Ana Maria RODRIGUEZ is a 24 y.o. female.   Chief Complaint   Patient presents with    Annual Exam    Discuss Medication     Patient would like to discuss increasing Prozac-  States she has been having vivid dreams, increased HR, panic attacks.         History of Present Illness   History of Present Illness  The patient is a 24-year-old female who presents to the office today for her annual physical exam. She is followed for monitoring of elevated LDL cholesterol level, severe obesity, previously screening for type 2 diabetes, exercise-induced asthma, and generalized anxiety disorder. She is currently prescribed Xopenex as needed and fluoxetine 40 mg daily. Her lipid panel in 02/2025 reflected a total cholesterol of 191 and LDL of 128, which was up from previous 114. She has been implementing a healthier diet, portion control, and regular exercise for management. She is eligible for a Pap smear but declined it today.    She has not had a Pap smear in the past 3 years and has been in a monogamous relationship for the past 8 years. She maintains regular dental and vision check-ups annually. She does not have medical insurance at present. She recently experienced an illness, which she believes was due to sinus or allergy issues, but she is feeling better now. She does not take any multivitamins.    She has lost 5 pounds since 04/2025, with her weight dropping to 208 pounds at one point. However, she has since stopped going to the gym and plans to resume her workouts. She reports overeating and emotional eating.    She continues to use her Xopenex inhaler as needed, particularly when exposed to secondhand smoke or cleaning products, which cause her to feel tightness in her chest.    In the recent weeks unfortunately she has lost her father (adoptive father). She experiences vivid dreams and panic attacks, which have disrupted her sleep. Despite being on anxiety medication, she still feels nervous and  anxious. Her work performance was affected last week, but it has since improved. She has been taking melatonin gummies at night to help with sleep, but they have not been effective. She has never been prescribed any specific sleep medication. She spends most of her time with her family and does not engage in many activities at home. She enjoys reading and playing video games but has not been able to do so recently. She also enjoys doing her nails but has not done them recently. She finds that forcing herself to do things she enjoys helps her feel better.    Marital Status:   Occupations: Works for [employer]  Hobbies: Reading, playing video games, doing nails  Sleep: Reports difficulty sleeping, vivid dreams, and panic attacks  Sexual Practices: Monogamous relationship for the past 8 years    FAMILY HISTORY  Her stepfather had a genetic illness that caused a massive stroke and brain bleed.       The following portions of the patient's history were reviewed and updated as appropriate: allergies, current medications, past family history, past medical history, past social history, past surgical history and problem list.    Review of Systems    Objective   Past Medical History:   Diagnosis Date    Scoliosis       Past Surgical History:   Procedure Laterality Date    COLONOSCOPY      WISDOM TOOTH EXTRACTION          Current Outpatient Medications:     FLUoxetine (PROzac) 40 MG capsule, Take 1 capsule by mouth Daily., Disp: 90 capsule, Rfl: 1    hydrOXYzine (ATARAX) 10 MG tablet, Take about 45 minutes before bed prn insomnia related to anxiety, may take 1-2 dose through out day if needed, 8 hours apart, Disp: 30 tablet, Rfl: 0    levalbuterol (Xopenex HFA) 45 MCG/ACT inhaler, Inhale 1-2 puffs Every 6 (Six) Hours As Needed for Wheezing or Shortness of Air. (Patient not taking: Reported on 7/31/2025), Disp: 15 g, Rfl: 1      /82 (BP Location: Left arm, Patient Position: Sitting, Cuff Size: Adult)   Pulse 71    "Temp 98.1 °F (36.7 °C) (Temporal)   Ht 157.5 cm (62\")   Wt 97.8 kg (215 lb 9.6 oz)   LMP 07/18/2025   SpO2 98%   BMI 39.43 kg/m²      Body mass index is 39.43 kg/m².  Class 2 Severe Obesity (BMI >=35 and <=39.9). Obesity-related health conditions include the following: none. Obesity is improving with lifestyle modifications. BMI is is above average; BMI management plan is completed. We discussed portion control and increasing exercise.       Physical Exam  Vitals and nursing note reviewed.   Constitutional:       General: She is not in acute distress.     Appearance: Normal appearance. She is obese. She is not ill-appearing, toxic-appearing or diaphoretic.   HENT:      Head: Normocephalic and atraumatic.      Right Ear: Tympanic membrane, ear canal and external ear normal. There is no impacted cerumen.      Left Ear: Tympanic membrane, ear canal and external ear normal. There is no impacted cerumen.      Nose: Nose normal.      Mouth/Throat:      Mouth: Mucous membranes are moist.      Pharynx: Oropharynx is clear. No oropharyngeal exudate or posterior oropharyngeal erythema.   Eyes:      Extraocular Movements: Extraocular movements intact.      Conjunctiva/sclera: Conjunctivae normal.      Pupils: Pupils are equal, round, and reactive to light.   Neck:      Thyroid: No thyroid mass, thyromegaly or thyroid tenderness.   Cardiovascular:      Rate and Rhythm: Normal rate and regular rhythm.      Pulses: Normal pulses.      Heart sounds: Normal heart sounds.   Pulmonary:      Effort: Pulmonary effort is normal.      Breath sounds: Normal breath sounds.   Abdominal:      General: Bowel sounds are normal.      Palpations: Abdomen is soft.   Musculoskeletal:         General: Normal range of motion.      Cervical back: Normal range of motion and neck supple.   Skin:     General: Skin is warm and dry.      Capillary Refill: Capillary refill takes less than 2 seconds.   Neurological:      General: No focal deficit " present.      Mental Status: She is alert and oriented to person, place, and time. Mental status is at baseline.   Psychiatric:         Attention and Perception: Attention normal.         Mood and Affect: Mood is anxious and depressed.         Speech: Speech normal.         Behavior: Behavior normal.         Thought Content: Thought content normal.         Cognition and Memory: Cognition normal.         Judgment: Judgment normal.               Assessment & Plan   Diagnoses and all orders for this visit:    1. Annual physical exam (Primary)  -     Lipid panel    2. Elevated LDL cholesterol level  -     Lipid panel    3. Class 2 severe obesity due to excess calories with serious comorbidity and body mass index (BMI) of 39.0 to 39.9 in adult  -     Comprehensive metabolic panel  -     Hemoglobin A1c    4. Screening for diabetes mellitus  -     Hemoglobin A1c    5. Exercise-induced asthma    6. CELESTINO (generalized anxiety disorder)  -     hydrOXYzine (ATARAX) 10 MG tablet; Take about 45 minutes before bed prn insomnia related to anxiety, may take 1-2 dose through out day if needed, 8 hours apart  Dispense: 30 tablet; Refill: 0    7. Grieving  -     hydrOXYzine (ATARAX) 10 MG tablet; Take about 45 minutes before bed prn insomnia related to anxiety, may take 1-2 dose through out day if needed, 8 hours apart  Dispense: 30 tablet; Refill: 0    8. Primary insomnia  -     hydrOXYzine (ATARAX) 10 MG tablet; Take about 45 minutes before bed prn insomnia related to anxiety, may take 1-2 dose through out day if needed, 8 hours apart  Dispense: 30 tablet; Refill: 0                 Assessment & Plan  1. Annual physical exam.  - Blood pressure readings are within the normal range today at 126/82.  - Achieved a weight loss of 5 pounds since April 2025.  - Last lab results from February 2025 were satisfactory.  - Advised to continue with current lifestyle modifications, including a healthier diet, portion control, and regular exercise. A  Pap smear is recommended, but she has declined it at this time. A recheck of cholesterol levels is suggested due to recent weight loss and lifestyle changes. Additionally, kidney function and liver enzymes will be checked. Encouraged to take an Emergen-C packet or gummy daily for a few weeks to boost the immune system.  - Continue with routine dental and eye exam  - Follows gynecology, anticipates updating Pap in near future    2. Generalized anxiety disorder/reactive depression.  - Reports experiencing vivid dreams and difficulty sleeping, which may be exacerbated by recent loss of her father. The current dose of fluoxetine (Prozac) 40 mg daily.  - Hydroxyzine 10 mg tablets are prescribed for sleep, with instructions to take one tablet initially and increase to two tablets if needed.  - If hydroxyzine proves effective for anxiety, she may take a dose during the day as well, but should avoid driving if it induces drowsiness.  - If sleep improves but daytime anxiety persists, an increase in fluoxetine to 60 mg daily may be considered. -She will keep me updated on effect.    3. Exercise-induced asthma.  - Currently using Xopenex as needed and will continue to do so.  - Albuterol will be removed from the medication list due to previous intolerance.    4.  Class II severe obesity.  - Lost 5 pounds since April 2025.  - Encouraged to continue with current lifestyle modifications, including a healthier diet, portion control, and regular exercise.  - Advised to return to the gym and incorporate cardio exercises gradually.    5. Elevated LDL cholesterol.  - Lipid panel in February 2025 showed a total cholesterol of 191 and LDL of 128, which was an increase from previous levels.  - Advised to continue with current lifestyle modifications, including a healthier diet, portion control, and regular exercise.  - A recheck of cholesterol levels will be conducted.    Patient or patient representative verbalized consent for the use  of Ambient Listening during the visit with  LEANNE Ortega for chart documentation. 7/31/2025  16:40 CDT    Please note that portions of this note were completed with a voice recognition program.     Electronically signed by LEANNE Ortega, 07/31/25, 16:40 CDT.

## 2025-08-01 LAB
ALBUMIN SERPL-MCNC: 4.2 G/DL (ref 4–5)
ALP SERPL-CCNC: 82 IU/L (ref 44–121)
ALT SERPL-CCNC: 18 IU/L (ref 0–32)
AST SERPL-CCNC: 13 IU/L (ref 0–40)
BILIRUB SERPL-MCNC: <0.2 MG/DL (ref 0–1.2)
BUN SERPL-MCNC: 11 MG/DL (ref 6–20)
BUN/CREAT SERPL: 15 (ref 9–23)
CALCIUM SERPL-MCNC: 9.1 MG/DL (ref 8.7–10.2)
CHLORIDE SERPL-SCNC: 103 MMOL/L (ref 96–106)
CHOLEST SERPL-MCNC: 176 MG/DL (ref 100–199)
CO2 SERPL-SCNC: 23 MMOL/L (ref 20–29)
CREAT SERPL-MCNC: 0.72 MG/DL (ref 0.57–1)
EGFRCR SERPLBLD CKD-EPI 2021: 119 ML/MIN/1.73
GLOBULIN SER CALC-MCNC: 2.6 G/DL (ref 1.5–4.5)
GLUCOSE SERPL-MCNC: 99 MG/DL (ref 70–99)
HBA1C MFR BLD: 5.4 % (ref 4.8–5.6)
HDLC SERPL-MCNC: 41 MG/DL
LDLC SERPL CALC-MCNC: 117 MG/DL (ref 0–99)
POTASSIUM SERPL-SCNC: 4.3 MMOL/L (ref 3.5–5.2)
PROT SERPL-MCNC: 6.8 G/DL (ref 6–8.5)
SODIUM SERPL-SCNC: 140 MMOL/L (ref 134–144)
TRIGL SERPL-MCNC: 98 MG/DL (ref 0–149)
VLDLC SERPL CALC-MCNC: 18 MG/DL (ref 5–40)

## 2025-08-15 DIAGNOSIS — R30.0 DYSURIA: Primary | ICD-10-CM

## 2025-08-15 DIAGNOSIS — B37.9 YEAST INFECTION: ICD-10-CM

## 2025-08-15 LAB
BILIRUB BLD-MCNC: NEGATIVE MG/DL
CLARITY, POC: CLEAR
COLOR UR: ABNORMAL
GLUCOSE UR STRIP-MCNC: NEGATIVE MG/DL
KETONES UR QL: NEGATIVE
LEUKOCYTE EST, POC: NEGATIVE
NITRITE UR-MCNC: NEGATIVE MG/ML
PH UR: 6 [PH] (ref 5–8)
PROT UR STRIP-MCNC: ABNORMAL MG/DL
RBC # UR STRIP: ABNORMAL /UL
SP GR UR: 1.03 (ref 1–1.03)
UROBILINOGEN UR QL: ABNORMAL

## 2025-08-15 PROCEDURE — 81003 URINALYSIS AUTO W/O SCOPE: CPT

## 2025-08-15 RX ORDER — FLUCONAZOLE 150 MG/1
150 TABLET ORAL ONCE
Qty: 1 TABLET | Refills: 0 | Status: SHIPPED | OUTPATIENT
Start: 2025-08-15 | End: 2025-08-15

## 2025-08-18 DIAGNOSIS — B96.89 BACTERIAL VAGINOSIS: Primary | ICD-10-CM

## 2025-08-18 DIAGNOSIS — N76.0 BACTERIAL VAGINOSIS: Primary | ICD-10-CM

## 2025-08-18 LAB
A VAGINAE DNA VAG QL NAA+PROBE: ABNORMAL SCORE
BVAB2 DNA VAG QL NAA+PROBE: ABNORMAL SCORE
C ALBICANS DNA VAG QL NAA+PROBE: NEGATIVE
C GLABRATA DNA VAG QL NAA+PROBE: NEGATIVE
C TRACH DNA SPEC QL NAA+PROBE: NEGATIVE
MEGA1 DNA VAG QL NAA+PROBE: ABNORMAL SCORE
N GONORRHOEA DNA VAG QL NAA+PROBE: NEGATIVE
T VAGINALIS DNA VAG QL NAA+PROBE: NEGATIVE

## 2025-08-18 RX ORDER — METRONIDAZOLE 500 MG/1
500 TABLET ORAL 2 TIMES DAILY
Qty: 20 TABLET | Refills: 0 | Status: SHIPPED | OUTPATIENT
Start: 2025-08-18 | End: 2025-08-28